# Patient Record
Sex: MALE | Race: WHITE | NOT HISPANIC OR LATINO | Employment: UNEMPLOYED | URBAN - METROPOLITAN AREA
[De-identification: names, ages, dates, MRNs, and addresses within clinical notes are randomized per-mention and may not be internally consistent; named-entity substitution may affect disease eponyms.]

---

## 2022-03-07 ENCOUNTER — OFFICE VISIT (OUTPATIENT)
Dept: FAMILY MEDICINE | Facility: CLINIC | Age: 32
End: 2022-03-07
Payer: COMMERCIAL

## 2022-03-07 VITALS
TEMPERATURE: 98.6 F | HEART RATE: 70 BPM | BODY MASS INDEX: 21.36 KG/M2 | SYSTOLIC BLOOD PRESSURE: 132 MMHG | DIASTOLIC BLOOD PRESSURE: 76 MMHG | RESPIRATION RATE: 16 BRPM | OXYGEN SATURATION: 99 % | WEIGHT: 166.4 LBS | HEIGHT: 74 IN

## 2022-03-07 DIAGNOSIS — Z23 NEED FOR TDAP VACCINATION: ICD-10-CM

## 2022-03-07 DIAGNOSIS — Z00.00 ROUTINE GENERAL MEDICAL EXAMINATION AT A HEALTH CARE FACILITY: Primary | ICD-10-CM

## 2022-03-07 DIAGNOSIS — Z13.1 SCREENING FOR DIABETES MELLITUS: ICD-10-CM

## 2022-03-07 DIAGNOSIS — Z13.220 SCREENING FOR HYPERLIPIDEMIA: ICD-10-CM

## 2022-03-07 DIAGNOSIS — F41.1 GENERALIZED ANXIETY DISORDER: ICD-10-CM

## 2022-03-07 DIAGNOSIS — L98.9 SKIN LESION OF SCALP: ICD-10-CM

## 2022-03-07 PROCEDURE — 90715 TDAP VACCINE 7 YRS/> IM: CPT | Performed by: NURSE PRACTITIONER

## 2022-03-07 PROCEDURE — 99385 PREV VISIT NEW AGE 18-39: CPT | Mod: 25 | Performed by: NURSE PRACTITIONER

## 2022-03-07 PROCEDURE — 90471 IMMUNIZATION ADMIN: CPT | Performed by: NURSE PRACTITIONER

## 2022-03-07 PROCEDURE — 99214 OFFICE O/P EST MOD 30 MIN: CPT | Mod: 25 | Performed by: NURSE PRACTITIONER

## 2022-03-07 RX ORDER — TRIAMCINOLONE ACETONIDE 5 MG/G
OINTMENT TOPICAL
Qty: 45 G | Refills: 0 | Status: SHIPPED | OUTPATIENT
Start: 2022-03-07

## 2022-03-07 ASSESSMENT — ANXIETY QUESTIONNAIRES
6. BECOMING EASILY ANNOYED OR IRRITABLE: MORE THAN HALF THE DAYS
2. NOT BEING ABLE TO STOP OR CONTROL WORRYING: MORE THAN HALF THE DAYS
GAD7 TOTAL SCORE: 17
3. WORRYING TOO MUCH ABOUT DIFFERENT THINGS: NEARLY EVERY DAY
IF YOU CHECKED OFF ANY PROBLEMS ON THIS QUESTIONNAIRE, HOW DIFFICULT HAVE THESE PROBLEMS MADE IT FOR YOU TO DO YOUR WORK, TAKE CARE OF THINGS AT HOME, OR GET ALONG WITH OTHER PEOPLE: VERY DIFFICULT
7. FEELING AFRAID AS IF SOMETHING AWFUL MIGHT HAPPEN: MORE THAN HALF THE DAYS
1. FEELING NERVOUS, ANXIOUS, OR ON EDGE: NEARLY EVERY DAY
5. BEING SO RESTLESS THAT IT IS HARD TO SIT STILL: NEARLY EVERY DAY

## 2022-03-07 ASSESSMENT — ENCOUNTER SYMPTOMS
CHILLS: 0
FREQUENCY: 0
PALPITATIONS: 0
HEMATOCHEZIA: 0
EYE PAIN: 0
HEADACHES: 0
DYSURIA: 0
MYALGIAS: 0
JOINT SWELLING: 0
DIZZINESS: 0
SORE THROAT: 0
SHORTNESS OF BREATH: 0
COUGH: 0
WEAKNESS: 0
NAUSEA: 0
HEMATURIA: 0
NERVOUS/ANXIOUS: 1
ABDOMINAL PAIN: 0
HEARTBURN: 0
ARTHRALGIAS: 0
PARESTHESIAS: 0
FEVER: 0
DIARRHEA: 0
CONSTIPATION: 0

## 2022-03-07 ASSESSMENT — PAIN SCALES - GENERAL: PAINLEVEL: NO PAIN (0)

## 2022-03-07 ASSESSMENT — PATIENT HEALTH QUESTIONNAIRE - PHQ9
SUM OF ALL RESPONSES TO PHQ QUESTIONS 1-9: 16
5. POOR APPETITE OR OVEREATING: MORE THAN HALF THE DAYS
SUM OF ALL RESPONSES TO PHQ QUESTIONS 1-9: 16
10. IF YOU CHECKED OFF ANY PROBLEMS, HOW DIFFICULT HAVE THESE PROBLEMS MADE IT FOR YOU TO DO YOUR WORK, TAKE CARE OF THINGS AT HOME, OR GET ALONG WITH OTHER PEOPLE: EXTREMELY DIFFICULT

## 2022-03-07 NOTE — PATIENT INSTRUCTIONS
Preventive Health Recommendations  Male Ages 26 - 39    Yearly exam:             See your health care provider every year in order to  o   Review health changes.   o   Discuss preventive care.    o   Review your medicines if your doctor has prescribed any.    You should be tested each year for STDs (sexually transmitted diseases), if you re at risk.     After age 35, talk to your provider about cholesterol testing. If you are at risk for heart disease, have your cholesterol tested at least every 5 years.     If you are at risk for diabetes, you should have a diabetes test (fasting glucose).  Shots: Get a flu shot each year. Get a tetanus shot every 10 years.     Nutrition:    Eat at least 5 servings of fruits and vegetables daily.     Eat whole-grain bread, whole-wheat pasta and brown rice instead of white grains and rice.     Get adequate Calcium and Vitamin D.     Lifestyle    Exercise for at least 150 minutes a week (30 minutes a day, 5 days a week). This will help you control your weight and prevent disease.     Limit alcohol to one drink per day.     No smoking.     Wear sunscreen to prevent skin cancer.     See your dentist every six months for an exam and cleaning.    Lifestyle Changes to improve Mood    - Take a Vitamin D 3- 4000 units daily     - Mind Over Mood (a workbook)     - Go to bed at 10 pm on school/work nights     - Avoid screens starting at 8 pm    - Exercise at minimum 30 minutes 3-4 times a week     - Eat three meals a day with protein, complex carbs, and a fruit or veggie and two snacks mid morning and mid afternoon           Consider adding Omega 3 supplement to diet.  Omega 3 has been shown to improve attention, cognitive clarity, and mood.  It is recommended the Omega 3 contains 1 gram of EPA (a component of Omega) daily.  Greenbrier Brite (omegabrite.com) or Coromega Max (found on Amazon or https://PandaBed)    Try the option of using Lumosity.com as another means of trying to improve  "your attention span.    Health/Lifestyle changes which can improve symptoms of anxiety, and depression, and ADHD/ADD:  - Avoid simple carbohydrates at breakfast.  Aim for only complex carbohydrates and lean protein for your morning meal.  - Engage in aerobic exercise 4 times per week for at least 30 minutes.  - Take a high quality multi-vitamin and antioxidant daily in conjunction with balanced nutrition.  - Aim for the high end of daily water intake, around 72 ounces per day.  - Ensure regular meals and snacks to maintain optimal attention.  - A source of information about the relationship between exercise and brain functioning is the book  Spark: The revolutionary new science of exercise and the brain  by Radames Webster.  - Avoid the use of caffeine in any amount.  - Keep alcohol consumption low, meaning only an occasional drink.  This is especially true when taking antidepressant medications.    Stress / Anxiety  - Recommend doing a free 8 week Mindfulness-Based Stress Reduction (MBSR) Program  o To access the free program, go to https://[employer]/index.html  - Check out [employer] for therapists and see who you \"click with.\"   - Consider a meditation Adelia if you want, check out Calm or Headspace - I like guided imagery / progressive relaxation    Sleep Hygiene  - Have a regular bedtime and a regular wake-up time  - No matter how tired you feel during the day, avoid napping before your regularly scheduled bedtime.  Napping diminishes your need for sleep and makes it difficult to sleep at night.  - Use your bed for sleeping (and sex) only.  - Make sure your bedroom is quiet and dark.  If noise is a problem, use earplugs.  If the light is a problem, cover your eyes with something to mask the light.  - Exercise regularly and often so that you use up all your energy and sleep the sleep of exhaustion.  Avoid exercising within 2 hours of bedtime.  - Give yourself an hour or so to unwind before you " get into bed.  Put aside serious matters and occupy yourself with something light, such as reading.  Consider taking a warm shower an hour before bedtime.  You might also want to try relaxation techniques.  If you are worried or preoccupied, set another time aside for journaling your feelings or writing your problems.  - Do the same thing every night before going to bed to help your body get ready for sleep.  - Have a ritual as your bedtime approaches, such as laying out your clothes for the next day, brushing your teeth, and then reading for 20 minutes.  Soon you will connect these activities with sleeping and they will make you sleepy.  - Avoid caffeine (coffee, tea, caffeinated soft drinks, and chocolate) within 6 hours of bedtime.  These will keep you awake.  - If you can't sleep, get up.  Sit quietly for about 20 minutes, and then return to bed.  Repeat this as many times as needed until you fall asleep.  - Avoid getting upset over your sleeplessness.  A lack of sleep will not harm you.  At worst, you will feel tired the next day.  - Avoid over-the-counter sleeping medication if you can.  Sleeping medications may leave you groggy the next day and require higher and higher doses over time.  Once you stop taking sleeping medications, your sleep problems may come back.  At best, sleeping pills are temporary relief, not a cure, and using them may make your sleeping problems worse.  - Invest in a weighted blanket or Sleep Pod-Adult Swaddle [place], which provides Deep Touch Pressure Therapy to help calm, relax, and improve sleep.  - Keep alcohol consumption low, meaning only an occasional drink.    Mental Health Referral Recommendations for Therapy    - Saleem Bustamante PhD LP  (For children, adolescents, and young adults, ADD/ADHD, Autism)  [address]  [address].  [place], [place] [address] [phone]  - NORMAN Santiago through [facility]  - Luis Gonzalez, PhD, Licensed  "Psychologist, LMFT through [facility] [place] [address].  [phone]   Coaching - individual and parent.  Family/Marital Therapy  - [facility], [place], [place].  [phone]   Individual, Family, and Group Psychotherapy  - [facility] and you can search for therapists and see who you \"click with.\"       Mental Health Referral Recommendations for Assessment/Testing/Treatment for all ages    - Saleem Bustamante PhD LP  (For children, adolescents, and young adults, ADD/ADHD, Autism)  [address]  [address] [address]  [phone]  - [facility]  - Luis Gonzalez PhD, Licensed Psychologist, LMFT through [facility] [place] [address].  [phone]   Assessment/Testing for Mood Disorders, Learning Disabilities, ADD/ADHD, Complex Issues  - [facility], [place], [place].  [phone]   Assessment/Testing for Depression, Anxiety, Bipolar, ADHD/ADD, Learning Disabilities, Autism Spectrum Disorders, Memory Testing, Schizophrenic Disorders    "

## 2022-03-07 NOTE — PROGRESS NOTES
SUBJECTIVE:   CC: Simon Corrales is an 31 year old male who presents for preventative health visit.       Patient has been advised of split billing requirements and indicates understanding: Yes  Healthy Habits:     Getting at least 3 servings of Calcium per day:  Yes    Bi-annual eye exam:  Yes    Dental care twice a year:  NO    Sleep apnea or symptoms of sleep apnea:  None    Diet:  Regular (no restrictions)    Frequency of exercise:  2-3 days/week    Duration of exercise:  15-30 minutes    Taking medications regularly:  Yes    Medication side effects:  None    PHQ-2 Total Score: 5    Additional concerns today:  No    Rash behind right ear, did have similar  Rash in 2014.   Says there was a little pus at first.  Had for a couple weeks.  Hydrocortisone for itch helps.    Concerns of Anxiety:  Says he has had anxiety for years.  Says he was diagnosed with ADD back in college and was on Adderall that helped a little but curbed his appetite.  Deals with some social anxiety and anxiety at work.    Currently unemployed since December 2021.  Company went under.  Was in design industry.    Is a drummer    Today's PHQ-2 Score:   PHQ-2 ( 1999 [employer]) 3/7/2022   Q1: Little interest or pleasure in doing things 3   Q2: Feeling down, depressed or hopeless 2   PHQ-2 Score 5   Q1: Little interest or pleasure in doing things Nearly every day   Q2: Feeling down, depressed or hopeless More than half the days   PHQ-2 Score 5       Abuse: Current or Past(Physical, Sexual or Emotional)- NO  Do you feel safe in your environment? YES    Have you ever done Advance Care Planning? (For example, a Health Directive, POLST, or a discussion with a medical provider or your loved ones about your wishes): No, advance care planning information given to patient to review.  Patient plans to discuss their wishes with loved ones or provider.      Social History     Tobacco Use     Smoking status: Never Smoker     Smokeless tobacco: Never Used    Substance Use Topics     Alcohol use: Yes     Comment: a few per wk     If you drink alcohol do you typically have >3 drinks per day or >7 drinks per week? No    Alcohol Use 3/7/2022   Prescreen: >3 drinks/day or >7 drinks/week? No   Prescreen: >3 drinks/day or >7 drinks/week? -   No flowsheet data found.    Last PSA: No results found for: PSA    Reviewed orders with patient. Reviewed health maintenance and updated orders accordingly - Yes  BP Readings from Last 3 Encounters:   03/07/22 132/76    Wt Readings from Last 3 Encounters:   03/07/22 75.5 kg (166 lb 6.4 oz)                  Patient Active Problem List   Diagnosis     Generalized anxiety disorder     History reviewed. No pertinent surgical history.    Social History     Tobacco Use     Smoking status: Never Smoker     Smokeless tobacco: Never Used   Substance Use Topics     Alcohol use: Yes     Comment: a few per wk     Family History   Problem Relation Age of Onset     Hyperlipidemia Father      Anxiety Disorder Brother      Depression Brother          No Known Allergies    Reviewed and updated as needed this visit by clinical staff   Tobacco  Allergies  Meds  Problems  Med Hx  Surg Hx  Fam Hx          Reviewed and updated as needed this visit by Provider     Meds  Problems  Med Hx  Surg Hx  Fam Hx           Review of Systems   Constitutional: Negative for chills and fever.   HENT: Negative for congestion, ear pain, hearing loss and sore throat.    Eyes: Negative for pain and visual disturbance.   Respiratory: Negative for cough and shortness of breath.    Cardiovascular: Negative for chest pain, palpitations and peripheral edema.   Gastrointestinal: Negative for abdominal pain, constipation, diarrhea, heartburn, hematochezia and nausea.   Genitourinary: Negative for dysuria, frequency, genital sores, hematuria, impotence, penile discharge and urgency.   Musculoskeletal: Negative for arthralgias, joint swelling and myalgias.   Skin: Positive  "for rash.   Neurological: Negative for dizziness, weakness, headaches and paresthesias.   Psychiatric/Behavioral: Negative for mood changes. The patient is nervous/anxious.        OBJECTIVE:   /76 (BP Location: Right arm)   Pulse 70   Temp 98.6  F (37  C) (Oral)   Resp 16   Ht 1.873 m (6' 1.75\")   Wt 75.5 kg (166 lb 6.4 oz)   SpO2 99%   BMI 21.51 kg/m      Physical Exam  GENERAL: healthy, alert and no distress  EYES: Eyes grossly normal to inspection, PERRL and conjunctivae and sclerae normal  HENT: ear canals and TM's normal, nose and mouth without ulcers or lesions  NECK: no adenopathy, no asymmetry, masses, or scars and thyroid normal to palpation  RESP: lungs clear to auscultation - no rales, rhonchi or wheezes  CV: regular rate and rhythm, normal S1 S2, no S3 or S4, no murmur, click or rub, no peripheral edema and peripheral pulses strong  ABDOMEN: soft, nontender, no hepatosplenomegaly, no masses and bowel sounds normal  MS: no gross musculoskeletal defects noted, no edema  SKIN:  On scalp behind right ear there is a pink circular, fine plaque  NEURO: Normal strength and tone, mentation intact and speech normal  PSYCH: mentation appears normal, affect normal/bright        ASSESSMENT/PLAN:   Simon was seen today for physical.    Diagnoses and all orders for this visit:    Routine general medical examination at a health care facility    Generalized anxiety disorder  Uncontrolled  - Therapeutic listening provided.  Discussed with patient the indication and use of medication(s), risks/benefits, and potential adverse side effects.  Patient/guardian verbalized understanding and agreement with the plan.   -     sertraline (ZOLOFT) 50 MG tablet; Take 1 tablet (50 mg) by mouth daily  -     Adult Mental Health  Referral; Future  Recommend considering starting therapy and patient agreeable to referral.  I also gave him some therapy options outside of [place] as well.  - Follow-up in 4 weeks for " "recheck    Skin lesion of scalp  -     triamcinolone (KENALOG) 0.5 % external ointment; Apply twice daily to lesion on scalp for 14 days  If does not improve, consider doing KOH scraping or antifungal    Screening for hyperlipidemia  -     Lipid panel reflex to direct LDL Fasting; Future    Screening for diabetes mellitus  -     Glucose; Future    Need for Tdap vaccination  -     TDAP VACCINE (Adacel, Boostrix)  [[id number]]          COUNSELING:   Reviewed preventive health counseling, as reflected in patient instructions       Regular exercise       Healthy diet/nutrition    Estimated body mass index is 21.51 kg/m  as calculated from the following:    Height as of this encounter: 1.873 m (6' 1.75\").    Weight as of this encounter: 75.5 kg (166 lb 6.4 oz).         He reports that he has never smoked. He has never used smokeless tobacco.      Counseling Resources:  ATP IV Guidelines  Pooled Cohorts Equation Calculator  FRAX Risk Assessment  ICSI Preventive Guidelines  Dietary Guidelines for Americans, 2010  Mobi's MyPlate  ASA Prophylaxis  Lung CA Screening    Elvira French NP  [facility]  Answers for HPI/ROS submitted by the patient on 3/7/2022  If you checked off any problems, how difficult have these problems made it for you to do your work, take care of things at home, or get along with other people?: Extremely difficult  PHQ9 TOTAL SCORE: 16      "

## 2022-03-08 ASSESSMENT — ANXIETY QUESTIONNAIRES: GAD7 TOTAL SCORE: 17

## 2022-03-08 ASSESSMENT — PATIENT HEALTH QUESTIONNAIRE - PHQ9: SUM OF ALL RESPONSES TO PHQ QUESTIONS 1-9: 16

## 2022-03-10 PROBLEM — F41.1 GENERALIZED ANXIETY DISORDER: Status: ACTIVE | Noted: 2022-03-10

## 2022-04-06 ENCOUNTER — TELEPHONE (OUTPATIENT)
Dept: FAMILY MEDICINE | Facility: CLINIC | Age: 32
End: 2022-04-06

## 2022-04-06 ENCOUNTER — TELEPHONE (OUTPATIENT)
Dept: FAMILY MEDICINE | Facility: CLINIC | Age: 32
End: 2022-04-06
Payer: COMMERCIAL

## 2022-04-06 NOTE — TELEPHONE ENCOUNTER
Please call pharmacy    RN on hold for 20 min.    RN calling to determine why patient does not have refill.  Patient notified via text from pharmacy.        RN spoke with patient.    Patient stated he had received a text from [facility] that he need to contact his Provider for refill on Zoloft.    RN noted there should be a refill on file.    RN advised she would reach out to the pharmacy to discuss.  RN unable to speak with pharmacy  On hold for extended time    RN aleena patient would attempt again to call.    Doni Armendariz RN, BSN, PHN  [facility]

## 2022-04-07 NOTE — TELEPHONE ENCOUNTER
RN was able to get thru to patients pharmacy.    Patient does have refill on file.  They will fill medication today and text patient when it is ready.    RN called and left detailed VM advising prescription was being filled.    Patient stated yesterday it was ok to leave message.    Doni Armendariz, RN, BSN, PHN  [facility]

## 2022-04-19 ENCOUNTER — VIRTUAL VISIT (OUTPATIENT)
Dept: FAMILY MEDICINE | Facility: CLINIC | Age: 32
End: 2022-04-19
Payer: COMMERCIAL

## 2022-04-19 DIAGNOSIS — F41.1 GENERALIZED ANXIETY DISORDER: Primary | ICD-10-CM

## 2022-04-19 PROCEDURE — 96127 BRIEF EMOTIONAL/BEHAV ASSMT: CPT | Mod: 95 | Performed by: NURSE PRACTITIONER

## 2022-04-19 PROCEDURE — 99213 OFFICE O/P EST LOW 20 MIN: CPT | Mod: 95 | Performed by: NURSE PRACTITIONER

## 2022-04-19 ASSESSMENT — ANXIETY QUESTIONNAIRES
6. BECOMING EASILY ANNOYED OR IRRITABLE: NOT AT ALL
1. FEELING NERVOUS, ANXIOUS, OR ON EDGE: SEVERAL DAYS
IF YOU CHECKED OFF ANY PROBLEMS ON THIS QUESTIONNAIRE, HOW DIFFICULT HAVE THESE PROBLEMS MADE IT FOR YOU TO DO YOUR WORK, TAKE CARE OF THINGS AT HOME, OR GET ALONG WITH OTHER PEOPLE: SOMEWHAT DIFFICULT
2. NOT BEING ABLE TO STOP OR CONTROL WORRYING: NOT AT ALL
5. BEING SO RESTLESS THAT IT IS HARD TO SIT STILL: SEVERAL DAYS
GAD7 TOTAL SCORE: 3
3. WORRYING TOO MUCH ABOUT DIFFERENT THINGS: SEVERAL DAYS
7. FEELING AFRAID AS IF SOMETHING AWFUL MIGHT HAPPEN: NOT AT ALL

## 2022-04-19 ASSESSMENT — PATIENT HEALTH QUESTIONNAIRE - PHQ9: 5. POOR APPETITE OR OVEREATING: NOT AT ALL

## 2022-04-19 NOTE — PROGRESS NOTES
Simon is a 31 year old who is being evaluated via a billable telephone visit.      What phone number would you like to be contacted at? [phone]  How would you like to obtain your AVS? Mail a copy    Assessment & Plan     Generalized anxiety disorder  Chronic, stable, continue current dosage of:  - sertraline (ZOLOFT) 50 MG tablet; Take 1 tablet (50 mg) by mouth daily  - Has initial therapy visit scheduled for next week.                 Return in about 6 months (around 10/19/2022) for anxiety check.    Elvira French NP  [facility]   Simon is a 31 year old who presents for the following health issues     HPI     Anxiety Follow-Up    How are you doing with your anxiety since your last visit? Improved      Are you having other symptoms that might be associated with anxiety? No    Have you had a significant life event? No     Are you feeling depressed? No    Do you have any concerns with your use of alcohol or other drugs? No  Feels more calm, less figidity with the Sertraline 50 mg.  When first starting the medication he had loose stools so he did cut back to 1/2 tablet and then was able to increase to 1 tablet daily.  He is tolerating this now without side effects.  Would like to continue with current dosage.  He does have initial therapy appointment scheduled next week.    Arben pete now    Says the rash from last visit has resolved.    Social History     Tobacco Use     Smoking status: Never Smoker     Smokeless tobacco: Never Used   Substance Use Topics     Alcohol use: Yes     Comment: a few per wk     Drug use: Yes     Types: Marijuana     DAVE-7 SCORE 3/7/2022 4/19/2022   Total Score 17 3     PHQ 3/7/2022   PHQ-9 Total Score 16   Q9: Thoughts of better off dead/self-harm past 2 weeks Not at all     DAVE-7  4/19/2022   1. Feeling nervous, anxious, or on edge 1   2. Not being able to stop or control worrying 0   3. Worrying too much about different things 1   4.  Trouble relaxing 0   5. Being so restless that it is hard to sit still 1   6. Becoming easily annoyed or irritable 0   7. Feeling afraid, as if something awful might happen 0   DAVE-7 Total Score 3   If you checked any problems, how difficult have they made it for you to do your work, take care of things at home, or get along with other people? Somewhat difficult         How many servings of fruits and vegetables do you eat daily?  2-3    On average, how many sweetened beverages do you drink each day (Examples: soda, juice, sweet tea, etc.  Do NOT count diet or artificially sweetened beverages)?   1    How many days per week do you exercise enough to make your heart beat faster? 7    How many minutes a day do you exercise enough to make your heart beat faster? 30 - 60    How many days per week do you miss taking your medication? 0    Review of Systems   Constitutional, HEENT, cardiovascular, pulmonary, gi and psychiatric systems are negative, except as otherwise noted.      Objective           Vitals:  No vitals were obtained today due to virtual visit.    Physical Exam   healthy, alert and no distress  PSYCH: Alert and oriented times 3; coherent speech, normal   rate and volume, able to articulate logical thoughts, able   to abstract reason, no tangential thoughts, no hallucinations   or delusions  His affect is normal  RESP: No cough, no audible wheezing, able to talk in full sentences  Remainder of exam unable to be completed due to telephone visits              Phone call duration: 8 minutes

## 2022-04-20 ASSESSMENT — ANXIETY QUESTIONNAIRES: GAD7 TOTAL SCORE: 3

## 2022-04-26 ENCOUNTER — TELEPHONE (OUTPATIENT)
Dept: BEHAVIORAL HEALTH | Facility: CLINIC | Age: 32
End: 2022-04-26
Payer: COMMERCIAL

## 2022-04-26 NOTE — TELEPHONE ENCOUNTER
Left a VM to remind pt about their Thursday 4/28/22 video appt at 4:30 pm, that will be connect through video link via e-mail: [email or web address].

## 2022-04-27 ASSESSMENT — PATIENT HEALTH QUESTIONNAIRE - PHQ9
SUM OF ALL RESPONSES TO PHQ QUESTIONS 1-9: 10
10. IF YOU CHECKED OFF ANY PROBLEMS, HOW DIFFICULT HAVE THESE PROBLEMS MADE IT FOR YOU TO DO YOUR WORK, TAKE CARE OF THINGS AT HOME, OR GET ALONG WITH OTHER PEOPLE: SOMEWHAT DIFFICULT
SUM OF ALL RESPONSES TO PHQ QUESTIONS 1-9: 10

## 2022-04-28 ENCOUNTER — VIRTUAL VISIT (OUTPATIENT)
Dept: PSYCHOLOGY | Facility: CLINIC | Age: 32
End: 2022-04-28
Attending: NURSE PRACTITIONER
Payer: COMMERCIAL

## 2022-04-28 DIAGNOSIS — F41.1 GENERALIZED ANXIETY DISORDER: ICD-10-CM

## 2022-04-28 DIAGNOSIS — F43.21 ADJUSTMENT DISORDER WITH DEPRESSED MOOD: Primary | ICD-10-CM

## 2022-04-28 PROCEDURE — 90791 PSYCH DIAGNOSTIC EVALUATION: CPT | Mod: 95 | Performed by: SOCIAL WORKER

## 2022-04-28 ASSESSMENT — PATIENT HEALTH QUESTIONNAIRE - PHQ9: SUM OF ALL RESPONSES TO PHQ QUESTIONS 1-9: 10

## 2022-04-28 ASSESSMENT — COLUMBIA-SUICIDE SEVERITY RATING SCALE - C-SSRS: 1. HAVE YOU WISHED YOU WERE DEAD OR WISHED YOU COULD GO TO SLEEP AND NOT WAKE UP?: NO

## 2022-04-28 NOTE — PROGRESS NOTES
"    [facility]  Provider Name:  Colette Sauceda     Credentials:  SELWYN, MIGUEL    PATIENT'S NAME: Simon Corrales  PREFERRED NAME: Simon  PRONOUNS:  he/him     MRN: [id number]  : 1990  ADDRESS: [address] [place] [id number]  ACCT. NUMBER:  [id number]  DATE OF SERVICE: 22  START TIME: 16:35  END TIME: 17:22  PREFERRED PHONE: [phone]  May we leave a program related message: Yes  SERVICE MODALITY:  Video Visit:      Provider verified identity through the following two step process.  Patient provided:  Patient  and Patient address    Telemedicine Visit: The patient's condition can be safely assessed and treated via synchronous audio and visual telemedicine encounter.      Reason for Telemedicine Visit: Patient has requested telehealth visit    Originating Site (Patient Location): Patient's home    Distant Site (Provider Location): Provider Remote Setting- Home Office    Consent:  The patient/guardian has verbally consented to: the potential risks and benefits of telemedicine (video visit) versus in person care; bill my insurance or make self-payment for services provided; and responsibility for payment of non-covered services.     Patient would like the video invitation sent by:  My Chart    Mode of Communication:  Video Conference via [employer]    As the provider I attest to compliance with applicable laws and regulations related to telemedicine.    UNIVERSAL ADULT Mental Health DIAGNOSTIC ASSESSMENT    Identifying Information:  Patient is a 31 year old, .  The pronoun use throughout this assessment reflects the patient's chosen pronoun.  Patient was referred for an assessment by primary care clinic.  Patient attended the session alone.    Chief Complaint:   The reason for seeking services at this time is: \"General anxiety, lack of motivation, self disappointment\"  \"It is the first I have ever been medicated for anxiety and I know that I have always had anxiety.  " "Anxiety has been more intense lately.\"  The problem(s) began 5/1/2012.    Patient has attempted to resolve these concerns in the past through playing the drumEventap and recently started medication.    Social/Family History:  Patient reported they grew up in other [place], [place].  They were raised by biological parents  .  Parents were always together.  Patient reported that their childhood was \"[place] doing some sort of activity or another.\"  Reports one older brother.  Patient described their current relationships with family of origin as \"I am pretty close with brother, who lives in [place].  My parents I would say, I very much love them.  I keep a lot of things from them.\"     The patient describes their cultural background as .  Cultural influences and impact on patient's life structure, values, norms, and healthcare: Both parents are Worship ([employer]) and I grew up attending [employer] regularly, including summer Bible school and [employer]ism camps. ;[place] I graduated in a class of 100 students and knew most of them throughout elementary, middle and high school..  Contextual influences on patient's health include: Individual Factors concerns with motivation and concentration, Economic Factors Job loss in November 2021 and Health- Seeking Factors trying therapy for first time.    These factors will be addressed in the Preliminary Treatment plan. Patient identified their preferred language to be English. Patient reported they does not need the assistance of an  or other support involved in therapy.     Patient reported had no significant delays in developmental tasks.   Patient's highest education level was college graduate  .  Patient identified the following learning problems: none reported.  Modifications will not be used to assist communication in therapy. Patient reports they are  able to understand written " "materials.    Patient reported the following relationship history \"Great, we were best friends before we started dating.\"  Patient's current relationship status is  for 5 years.   Patient identified their sexual orientation as heterosexual.  Patient reported having   child(héctor). Patient identified partner; mother as part of their support system.  Patient identified the quality of these relationships as good,  .      Patient's current living/housing situation involves staying in own home/apartment.  The immediate members of family and household include Juana Arango, 32,Wife and they report that housing is stable.    Patient is currently unemployed.  Patient reports their finances are obtained through spouse; other. Patient does identify finances as a current stressor.      Patient reported that they have not been involved with the legal system.  . Patient does not report being under probation/ parole/ jurisdiction.     Patient's Strengths and Limitations:  Patient identified the following strengths or resources that will help them succeed in treatment: motivation. Things that may interfere with the patient's success in treatment include: not being able to fully trust the process.     Assessments:  The following assessments were completed by patient for this visit:  PHQ2:   PHQ-2 ( 1999 [employer]) 4/27/2022 4/19/2022 3/7/2022   Q1: Little interest or pleasure in doing things 2 0 3   Q2: Feeling down, depressed or hopeless 1 0 2   PHQ-2 Score 3 0 5   Q1: Little interest or pleasure in doing things More than half the days - Nearly every day   Q2: Feeling down, depressed or hopeless Several days - More than half the days   PHQ-2 Score 3 - 5     PHQ9:   PHQ-9 SCORE 3/7/2022 4/27/2022   PHQ-9 Total Score MyChart 16 (Moderately severe depression) 10 (Moderate depression)   PHQ-9 Total Score 16 10     GAD2:   DAVE-2 4/27/2022   Feeling nervous, anxious, or on edge 1   Not being able to stop or control worrying 1   DAVE-2 " Total Score 2     GAD7:   DAVE-7 SCORE 3/7/2022 4/19/2022   Total Score 17 3     CAGE-AID:   CAGE-AID Total Score 4/27/2022   Total Score 3   Total Score MyChart 3 (A total score of 2 or greater is considered clinically significant)     PROMIS 10-Global Health (all questions and answers displayed):   PROMIS 10 4/27/2022   In general, would you say your health is: Good   In general, would you say your quality of life is: Good   In general, how would you rate your physical health? Good   In general, how would you rate your mental health, including your mood and your ability to think? Good   In general, how would you rate your satisfaction with your social activities and relationships? Fair   In general, please rate how well you carry out your usual social activities and roles Fair   To what extent are you able to carry out your everyday physical activities such as walking, climbing stairs, carrying groceries, or moving a chair? Completely   How often have you been bothered by emotional problems such as feeling anxious, depressed or irritable? Sometimes   How would you rate your fatigue on average? Mild   How would you rate your pain on average?   0 = No Pain  to  10 = Worst Imaginable Pain 0   In general, would you say your health is: 3   In general, would you say your quality of life is: 3   In general, how would you rate your physical health? 3   In general, how would you rate your mental health, including your mood and your ability to think? 3   In general, how would you rate your satisfaction with your social activities and relationships? 2   In general, please rate how well you carry out your usual social activities and roles. (This includes activities at home, at work and in your community, and responsibilities as a parent, child, spouse, employee, friend, etc.) 2   To what extent are you able to carry out your everyday physical activities such as walking, climbing stairs, carrying groceries, or moving a chair? 5    In the past 7 days, how often have you been bothered by emotional problems such as feeling anxious, depressed, or irritable? 3   In the past 7 days, how would you rate your fatigue on average? 2   In the past 7 days, how would you rate your pain on average, where 0 means no pain, and 10 means worst imaginable pain? 0   Global Mental Health Score 11   Global Physical Health Score 17   PROMIS TOTAL - SUBSCORES 28     [place] Suicide Severity Rating Scale (Lifetime/Recent)  [place] Suicide Severity Rating (Lifetime/Recent) 4/28/2022   1. Wish to be Dead (Lifetime) 0   Has subject engaged in non-suicidal self-injurious behavior? (Lifetime) 0       Personal and Family Medical History:  Patient does not report a family history of mental health concerns.  Patient reports family history includes Anxiety Disorder in his brother; Depression in his brother; Hyperlipidemia in his father..     Patient does report Mental Health Diagnosis and/or Treatment.  Patient Patient reported the following previous diagnoses which include(s): ADHD and an Anxiety Disorder.  Patient reported symptoms began in early 20s.   Patient has received mental health services in the past: primary care provider at received ADHD dx from primary care doctor..  Psychiatric Hospitalizations: None.  Patient denies a history of civil commitment.  Patient is receiving other mental health services.  These include primary care provider at [facility].  For follow-up on as needed.         Patient has had a physical exam to rule out medical causes for current symptoms.  Date of last physical exam was within the past year. Symptoms have developed since last physical exam and client was encouraged to follow up with PCP.  . The patient has a [facility] Primary Care Provider, who is named Elvira French.  Patient reports no current medical concerns.  Patient denies any issues with pain..   There are not significant appetite / nutritional concerns / weight changes.    Patient does not report a history of head injury / trauma / cognitive impairment.     Patient reports current meds as:   Outpatient Medications Marked as Taking for the 4/28/22 encounter (Virtual Visit) with Colette Sauceda LICSW   Medication Sig     sertraline (ZOLOFT) 50 MG tablet Take 1 tablet (50 mg) by mouth daily       Medication Adherence:  Patient reports taking.    Patient Allergies:  No Known Allergies - reviewed with patient    Medical History:  No past medical history on file.      Current Mental Status Exam:   Appearance:  Appropriate    Eye Contact:  Good   Psychomotor:  Normal       Gait / station:  Unable to assess via video  Attitude / Demeanor: Cooperative  Interested  Speech      Rate / Production: Normal/ Responsive      Volume:  Normal  volume      Language:  intact  Mood:   Normal  Affect:   Appropriate    Thought Content: Clear   Thought Process: Coherent  Logical       Associations: No loosening of associations  Insight:   Good   Judgment:  Intact   Orientation:  All  Attention/concentration: Good      Substance Use:  Patient did not report a family history of substance use concerns; see medical history section for details.  Patient has not received chemical dependency treatment in the past.  Patient has not ever been to detox.      Patient is not currently receiving any chemical dependency treatment.           Substance History of use Age of first use Date of last use     Pattern and duration of use (include amounts and frequency)   Alcohol currently use   17 4/23/2022 REPORTS SUBSTANCE USE: reports using substance 3 times per week and has 3 beer at a time.   Patient reports heaviest use was early twenties drank a little more.   Cannabis   currently use 19 4/20/2022 REPORTS SUBSTANCE USE: reports using substance 3 times per week and has couple of puffs from vape pen  from bed at a time.   Patient reports heaviest use is current use.     Amphetamines   used in the past   10/1/2011  prescribed   Cocaine/crack    never used       REPORTS SUBSTANCE USE: N/A   Hallucinogens used in the past   29  7/1/2021  REPORTS SUBSTANCE USE: reports using substance 1 times per year and has 1 mushroom bit at a time.   Patient reports heaviest use is current use.   Inhalants never used         REPORTS SUBSTANCE USE: N/A   Heroin never used         REPORTS SUBSTANCE USE: N/A   Other Opiates never used     REPORTS SUBSTANCE USE: N/A   Benzodiazepine   never used     REPORTS SUBSTANCE USE: N/A   Barbiturates never used     REPORTS SUBSTANCE USE: N/A   Over the counter meds used in the past 5 2/15/2022 As needed when sick   Caffeine currently use 4   REPORTS SUBSTANCE USE: reports using substance 1 times per day and has 1 coffee at a time.   Patient reports heaviest use is current use.   Nicotine  currently use 17 4/23/2022 REPORTS SUBSTANCE USE: reports using substance 2 times per month and has 2 cigarettes at a time.   Patient reports heaviest use is current use.   Other substances not listed above:  Identify:  never used     REPORTS SUBSTANCE USE: N/A     Patient reported the following problems as a result of their substance use: no problems, not applicable.    Substance Use: No symptoms    Based on the positive CAGE score and clinical interview there  are not indications of drug or alcohol abuse.      Significant Losses / Trauma / Abuse / Neglect Issues:   Patient did not serve in the .  There are indications or report of significant loss, trauma, abuse or neglect issues related to: job loss November 2021.  Concerns for possible neglect are not present.     Safety Assessment:   Patient denies current homicidal ideation and behaviors.  Patient denies current self-injurious ideation and behaviors.    Patient denied risk behaviors associated with substance use.  Patient denies any high risk behaviors associated with mental health symptoms.  Patient reports the following current concerns for their personal  safety: None.  Patient reports there are not firearms in the house.           History of Safety Concerns:  Patient denied a history of homicidal ideation.     Patient denied a history of personal safety concerns.    Patient denied a history of assaultive behaviors.    Patient denied a history of sexual assault behaviors.     Patient denied a history of risk behaviors associated with substance use.  Patient denies any history of high risk behaviors associated with mental health symptoms.  Patient reports the following protective factors: forward or future oriented thinking; dedication to family or friends; safe and stable environment; regular sleep; regular physical activity; sense of belonging; purpose; living with other people; sense of meaning; healthy fear of risky behaviors or pain; financial stability; strong sense of self worth or esteem; access to a variety of clinical interventions and pets    Risk Plan:  See Recommendations for Safety and Risk Management Plan    Review of Symptoms per patient report:  Depression: Lack of interest, Excessive or inappropriate guilt, Change in energy level and Feeling sad, down, or depressed  Martha:  No Symptoms  Psychosis: No Symptoms  Anxiety: Excessive worry, Nervousness, Physical complaints, such as headaches, stomachaches, muscle tension, Social anxiety, Psychomotor agitation and Ruminations  Panic:  No symptoms  Post Traumatic Stress Disorder:  No Symptoms   Eating Disorder: No Symptoms  ADD / ADHD:  Poor task completion, Distractibility, Forgetful and Restlessness/fidgety  Conduct Disorder: No symptoms  Autism Spectrum Disorder: No symptoms  Obsessive Compulsive Disorder: Checking and Symetry    Patient reports the following compulsive behaviors and treatment history: none.      Diagnostic Criteria:   Generalized Anxiety Disorder  A. Excessive anxiety and worry about a number of events or activities (such as work or school performance).   B. The person finds it  difficult to control the worry.  C. Select 3 or more symptoms (required for diagnosis). Only one item is required in children.   - Restlessness or feeling keyed up or on edge.    - Being easily fatigued.    - Difficulty concentrating or mind going blank.    - Muscle tension.   D. The focus of the anxiety and worry is not confined to features of an Axis I disorder.  E. The anxiety, worry, or physical symptoms cause clinically significant distress or impairment in social, occupational, or other important areas of functioning.   F. The disturbance is not due to the direct physiological effects of a substance (e.g., a drug of abuse, a medication) or a general medical condition (e.g., hyperthyroidism) and does not occur exclusively during a Mood Disorder, a Psychotic Disorder, or a Pervasive Developmental Disorder. Adjustment Disorder  A. The development of emotional or behavioral symptoms in response to an identifiable stressor(s) occurring within 3 months of the onset of the stressor(s)  B. These symptoms or behaviors are clinically significant, as evidenced by one or both of the following:       - Marked distress that is out of proportion to the severity/intensity of the stressor (with consideration for external context & culture)       - Significant impairment in social, occupational, or other important areas of functioning  C. The stress-related disturbance does not meet criteria for another disorder & is not not an exacerbation of another mental disorder  D. The symptoms do not represent normal bereavement  E. Once the stressor or its consequences have terminated, the symptoms do not persist for more than an additional 6 months       * Adjustment Disorder with Depressed Mood: The predominant manifestations are symptoms such as low mood, tearfulness, or feelings of hopelessness    Functional Status:  Patient reports the following functional impairments:  social interactions and work / vocational responsibilities.      Nonprogrammatic care:  Patient is requesting basic services to address current mental health concerns.    Clinical Summary:  1. Reason for assessment: lack of motivation, anxiety symptoms and .  2. Psychosocial, Cultural and Contextual Factors: Individual Factors concerns with motivation and concentration, Economic Factors Job loss in November 2021 and Health- Seeking Factors trying therapy for first time  .  3. Principal DSM5 Diagnoses  (Sustained by DSM5 Criteria Listed Above):   300.02 (F41.1) Generalized Anxiety Disorder  Adjustment Disorders  309.0 (F43.21) With depressed mood.  4. Other Diagnoses that is relevant to services:  defered  5. Provisional Diagnosis:  defered  6. Prognosis: Return to Normal Functioning and Expect Improvement.  7. Likely consequences of symptoms if not treated: Patient could see worsening symptoms and be in need of a higher level of care.  8. Client strengths include:  creative, educated and wants to learn .     Recommendations:     1. Plan for Safety and Risk Management:   Recommended that patient call 911 or go to the local ED should there be a change in any of these risk factors..          Report to child / adult protection services was NA.     2. Patient's identified cultural concerns will be addressed by discussing with patient personal values related to growing up in a small town.     3. Initial Treatment will focus on:    Anxiety - related to social situations  Adjustment Difficulties related to: recent job loss.     4. Resources/Service Plan:    services are not indicated.   Modifications to assist communication are not indicated.   Additional disability accommodations are not indicated.      5. Collaboration:   Collaboration / coordination of treatment will be initiated with the following  support professionals: primary care physician.      6.  Referrals:   The following referral(s) will be initiated: Outpatient Mental Scar Therapy. Next Scheduled Appointment:  5/4/22.     A Release of Information has been obtained for the following: verbal as needed.    7. JAY:    JAY:  Discussed the general effects of drugs and alcohol on health and well-being. Provider gave patient printed information about the effects of chemical use on their health and well being. Recommendations:  none .     8. Records:   These were reviewed at time of assessment.   Information in this assessment was obtained from the medical record and  provided by patient who is a good historian.    Patient will have open access to their mental health medical record.        Provider Name/ Credentials:  MIGUEL Limon    April 28, 2022

## 2022-05-04 ENCOUNTER — VIRTUAL VISIT (OUTPATIENT)
Dept: PSYCHOLOGY | Facility: CLINIC | Age: 32
End: 2022-05-04
Payer: COMMERCIAL

## 2022-05-04 DIAGNOSIS — F43.21 ADJUSTMENT DISORDER WITH DEPRESSED MOOD: ICD-10-CM

## 2022-05-04 DIAGNOSIS — F41.1 GENERALIZED ANXIETY DISORDER: Primary | ICD-10-CM

## 2022-05-04 PROCEDURE — 90834 PSYTX W PT 45 MINUTES: CPT | Mod: 95 | Performed by: SOCIAL WORKER

## 2022-05-04 NOTE — PROGRESS NOTES
[facility]                                     Progress Note    Patient Name: Simon Corrales  Date: May 4, 2022         Service Type: Individual      Session Start Time: 11:00  Session End Time: 11:39     Session Length: 39    Session #: 2    Attendees: Client attended alone    Service Modality:  Video Visit:      Provider verified identity through the following two step process.  Patient provided:  Patient is known previously to provider    Telemedicine Visit: The patient's condition can be safely assessed and treated via synchronous audio and visual telemedicine encounter.      Reason for Telemedicine Visit: Patient has requested telehealth visit    Originating Site (Patient Location): Patient's home    Distant Site (Provider Location): Provider Remote Setting- Home Office    Consent:  The patient/guardian has verbally consented to: the potential risks and benefits of telemedicine (video visit) versus in person care; bill my insurance or make self-payment for services provided; and responsibility for payment of non-covered services.     Patient would like the video invitation sent by:  My Chart    Mode of Communication:  Video Conference via Amwell    As the provider I attest to compliance with applicable laws and regulations related to telemedicine.    DATA  Interactive Complexity: No  Crisis: No        Progress Since Last Session (Related to Symptoms / Goals / Homework):   Symptoms: No change First full session with patient    Homework: Achieved / completed to satisfaction      Episode of Care Goals: Achieved / completed to satisfaction - PREPARATION (Decided to change - considering how); Intervened by negotiating a change plan and determining options / strategies for behavior change, identifying triggers, exploring social supports, and working towards setting a date to begin behavior change     Current / Ongoing Stressors and Concerns:   Patient has lost job and is feeling unmotivated in  life.  He reports a history of anxiety and notes that anxiety makes it difficult for him to have meaningful relationships.     Treatment Objective(s) Addressed in This Session:   identify 3-5 fears / thoughts that contribute to feeling anxious  Develop treatment plan     Intervention:   ACT: Reviewed diagnoses from assessment.  Gained consent for ACT using two mountains metaphor.  Went over [facility] policy and patient acknowledged.  Worked with patient to develop treatment plan with goals around adjustment and anxiety.    Assessments completed prior to visit:  The following assessments were completed by patient for this visit:  PHQ2:   PHQ-2 ( 1999 [employer]) 4/27/2022 4/19/2022 3/7/2022   Q1: Little interest or pleasure in doing things 2 0 3   Q2: Feeling down, depressed or hopeless 1 0 2   PHQ-2 Score 3 0 5   Q1: Little interest or pleasure in doing things More than half the days - Nearly every day   Q2: Feeling down, depressed or hopeless Several days - More than half the days   PHQ-2 Score 3 - 5     PHQ9:   PHQ-9 SCORE 3/7/2022 4/27/2022   PHQ-9 Total Score MyChart 16 (Moderately severe depression) 10 (Moderate depression)   PHQ-9 Total Score 16 10     GAD2:   DAVE-2 4/27/2022   Feeling nervous, anxious, or on edge 1   Not being able to stop or control worrying 1   DAVE-2 Total Score 2     GAD7:   DAVE-7 SCORE 3/7/2022 4/19/2022   Total Score 17 3     PROMIS 10-Global Health (all questions and answers displayed):   PROMIS 10 4/27/2022   In general, would you say your health is: Good   In general, would you say your quality of life is: Good   In general, how would you rate your physical health? Good   In general, how would you rate your mental health, including your mood and your ability to think? Good   In general, how would you rate your satisfaction with your social activities and relationships? Fair   In general, please rate how well you carry out your usual social activities and roles Fair   To what extent are you able  to carry out your everyday physical activities such as walking, climbing stairs, carrying groceries, or moving a chair? Completely   How often have you been bothered by emotional problems such as feeling anxious, depressed or irritable? Sometimes   How would you rate your fatigue on average? Mild   How would you rate your pain on average?   0 = No Pain  to  10 = Worst Imaginable Pain 0   In general, would you say your health is: 3   In general, would you say your quality of life is: 3   In general, how would you rate your physical health? 3   In general, how would you rate your mental health, including your mood and your ability to think? 3   In general, how would you rate your satisfaction with your social activities and relationships? 2   In general, please rate how well you carry out your usual social activities and roles. (This includes activities at home, at work and in your community, and responsibilities as a parent, child, spouse, employee, friend, etc.) 2   To what extent are you able to carry out your everyday physical activities such as walking, climbing stairs, carrying groceries, or moving a chair? 5   In the past 7 days, how often have you been bothered by emotional problems such as feeling anxious, depressed, or irritable? 3   In the past 7 days, how would you rate your fatigue on average? 2   In the past 7 days, how would you rate your pain on average, where 0 means no pain, and 10 means worst imaginable pain? 0   Global Mental Health Score 11   Global Physical Health Score 17   PROMIS TOTAL - SUBSCORES 28         ASSESSMENT: Current Emotional / Mental Status (status of significant symptoms):   Risk status (Self / Other harm or suicidal ideation)   Patient denies current fears or concerns for personal safety.   Patient denies current or recent suicidal ideation or behaviors.   Patient denies current or recent homicidal ideation or behaviors.   Patient denies current or recent self injurious behavior  or ideation.   Patient denies other safety concerns.   Patient reports there has been no change in risk factors since their last session.     Patient reports there has been no change in protective factors since their last session.     Recommended that patient call 911 or go to the local ED should there be a change in any of these risk factors.     Appearance:   Appropriate    Eye Contact:   Good    Psychomotor Behavior: Normal    Attitude:   Cooperative  Interested   Orientation:   All   Speech    Rate / Production: Normal/ Responsive    Volume:  Normal    Mood:    Normal   Affect:    Appropriate    Thought Content:  Clear    Thought Form:  Coherent  Logical    Insight:    Good      Medication Review:   No changes to current psychiatric medication(s)     Medication Compliance:   Yes     Changes in Health Issues:   None reported     Chemical Use Review:   Substance Use: Chemical use reviewed, no active concerns identified      Tobacco Use: No change in amount of tobacco use since last session.  Patient declined discussion at this time    Diagnosis:  1. Generalized anxiety disorder    2. Adjustment disorder with depressed mood        Collateral Reports Completed:   Not Applicable    PLAN: (Patient Tasks / Therapist Tasks / Other)  Patient will use dropping an anchor in the morning        MIGUEL Limon                                                         ______________________________________________________________________    Individual Treatment Plan    Patient's Name: Simon Corrales  YOB: 1990    Date of Creation: May 4, 2022  Date Treatment Plan Last Reviewed/Revised: May 4, 2022    DSM5 Diagnoses: 300.02 (F41.1) Generalized Anxiety Disorder or Adjustment Disorders  309.0 (F43.21) With depressed mood  Psychosocial / Contextual Factors: Individual Factors concerns with motivation and concentration, Economic Factors Job loss in November 2021 and Health- Seeking Factors trying therapy  for first time  .  PROMIS (reviewed every 90 days): 4/28/22 28    Referral / Collaboration:  Referral to another professional/service is not indicated at this time..    Anticipated number of session for this episode of care: 10-20  Anticipation frequency of session: Every other week  Anticipated Duration of each session: 38-52 minutes  Treatment plan will be reviewed in 90 days or when goals have been changed.       MeasurableTreatment Goal(s) related to diagnosis / functional impairment(s)  Goal 1: Patient will work to improve motivation    I will know I've met my goal when having a schedule for 5/7 days, would be completing tasks around the house to fix items, working on design work, improving on different skills (animation work), continuing to build my portfolio, connecting with friends throughout the week (texting friends).      Objective #A (Patient Action)    Patient will Increase interest, engagement, and pleasure in doing things.  Status: New - Date: 5/4/22     Intervention(s)  Therapist will teach about values and valued action.    Objective #B  Patient will identify 3-4 strategies to more effectively address stressors.  Status: New - Date: 5/4/22     Intervention(s)  Therapist will assign homework to engage in compassion and activities that help manage stressors  teach compassion.      Goal 2: Patient will work on managing anxiety in social situations    I will know I've met my goal when I can tell my friends that something bothers me and I would be honest with friends about what is going on in my life.      Objective #A (Patient Action)    Status: New - Date: 5/4/22     Patient will identify 3-5 fears / thoughts that contribute to feeling anxious.    Intervention(s)  Therapist will assign homework to engage in dropping an anchor  provide educational materials on anxiety  teach the choice point.    Objective #B  Patient will initiate 1 social contact(s) per day for increasing lengths of time.    Status:  New - Date: 5/4/22     Intervention(s)  Therapist will assign homework to reach out to friends and lean into anxiety  provide educational materials on mindfulness of emotion  teach emotional recognition/identification. by using values to engage in social situations.      Patient has reviewed and agreed to the above plan.      Colette Sauceda, MIGUEL  May 4, 2022

## 2022-05-18 ENCOUNTER — VIRTUAL VISIT (OUTPATIENT)
Dept: PSYCHOLOGY | Facility: CLINIC | Age: 32
End: 2022-05-18
Payer: COMMERCIAL

## 2022-05-18 DIAGNOSIS — F43.21 ADJUSTMENT DISORDER WITH DEPRESSED MOOD: ICD-10-CM

## 2022-05-18 DIAGNOSIS — F41.1 GENERALIZED ANXIETY DISORDER: Primary | ICD-10-CM

## 2022-05-18 PROCEDURE — 90834 PSYTX W PT 45 MINUTES: CPT | Mod: 95 | Performed by: SOCIAL WORKER

## 2022-05-18 NOTE — PROGRESS NOTES
[facility]                                     Progress Note    Patient Name: Simon Corrales  Date: May 18, 2022         Service Type: Individual      Session Start Time: 11:00  Session End Time: 11:38     Session Length: 38    Session #: 3    Attendees: Client attended alone    Service Modality:  Video Visit:      Provider verified identity through the following two step process.  Patient provided:  Patient is known previously to provider    Telemedicine Visit: The patient's condition can be safely assessed and treated via synchronous audio and visual telemedicine encounter.      Reason for Telemedicine Visit: Patient has requested telehealth visit    Originating Site (Patient Location): Patient's home    Distant Site (Provider Location): Provider Remote Setting- Home Office    Consent:  The patient/guardian has verbally consented to: the potential risks and benefits of telemedicine (video visit) versus in person care; bill my insurance or make self-payment for services provided; and responsibility for payment of non-covered services.     Patient would like the video invitation sent by:  My Chart    Mode of Communication:  Video Conference via Amwell    As the provider I attest to compliance with applicable laws and regulations related to telemedicine.    DATA  Interactive Complexity: No  Crisis: No        Progress Since Last Session (Related to Symptoms / Goals / Homework):   Symptoms: Improving reports feeling calmer and has been active in his yard    Homework: Achieved / completed to satisfaction      Episode of Care Goals: Achieved / completed to satisfaction - PREPARATION (Decided to change - considering how); Intervened by negotiating a change plan and determining options / strategies for behavior change, identifying triggers, exploring social supports, and working towards setting a date to begin behavior change  Satisfactory progress - ACTION (Actively working towards change); Intervened  by reinforcing change plan / affirming steps taken     Current / Ongoing Stressors and Concerns:   Patient has lost job and is feeling unmotivated in life.  He reports a history of anxiety and notes that anxiety makes it difficult for him to have meaningful relationships.       Treatment Objective(s) Addressed in This Session:   identify 3-5 fears / thoughts that contribute to feeling anxious     Intervention:   ACT: Provided psychoeducation on anxiety.  Discussed patient's response to anxiety and what the short-term versus long-term effects of these actions are.  Provided information on the choice point.     Assessments completed prior to visit:  The following assessments were completed by patient for this visit:  PHQ2:   PHQ-2 ( 1999 [employer]) 4/27/2022 4/19/2022 3/7/2022   Q1: Little interest or pleasure in doing things 2 0 3   Q2: Feeling down, depressed or hopeless 1 0 2   PHQ-2 Score 3 0 5   Q1: Little interest or pleasure in doing things More than half the days - Nearly every day   Q2: Feeling down, depressed or hopeless Several days - More than half the days   PHQ-2 Score 3 - 5     PHQ9:   PHQ-9 SCORE 3/7/2022 4/27/2022   PHQ-9 Total Score MyChart 16 (Moderately severe depression) 10 (Moderate depression)   PHQ-9 Total Score 16 10     GAD2:   DAVE-2 4/27/2022   Feeling nervous, anxious, or on edge 1   Not being able to stop or control worrying 1   DAVE-2 Total Score 2     GAD7:   DAVE-7 SCORE 3/7/2022 4/19/2022   Total Score 17 3     PROMIS 10-Global Health (all questions and answers displayed):   PROMIS 10 4/27/2022   In general, would you say your health is: Good   In general, would you say your quality of life is: Good   In general, how would you rate your physical health? Good   In general, how would you rate your mental health, including your mood and your ability to think? Good   In general, how would you rate your satisfaction with your social activities and relationships? Fair   In general, please rate  how well you carry out your usual social activities and roles Fair   To what extent are you able to carry out your everyday physical activities such as walking, climbing stairs, carrying groceries, or moving a chair? Completely   How often have you been bothered by emotional problems such as feeling anxious, depressed or irritable? Sometimes   How would you rate your fatigue on average? Mild   How would you rate your pain on average?   0 = No Pain  to  10 = Worst Imaginable Pain 0   In general, would you say your health is: 3   In general, would you say your quality of life is: 3   In general, how would you rate your physical health? 3   In general, how would you rate your mental health, including your mood and your ability to think? 3   In general, how would you rate your satisfaction with your social activities and relationships? 2   In general, please rate how well you carry out your usual social activities and roles. (This includes activities at home, at work and in your community, and responsibilities as a parent, child, spouse, employee, friend, etc.) 2   To what extent are you able to carry out your everyday physical activities such as walking, climbing stairs, carrying groceries, or moving a chair? 5   In the past 7 days, how often have you been bothered by emotional problems such as feeling anxious, depressed, or irritable? 3   In the past 7 days, how would you rate your fatigue on average? 2   In the past 7 days, how would you rate your pain on average, where 0 means no pain, and 10 means worst imaginable pain? 0   Global Mental Health Score 11   Global Physical Health Score 17   PROMIS TOTAL - SUBSCORES 28         ASSESSMENT: Current Emotional / Mental Status (status of significant symptoms):   Risk status (Self / Other harm or suicidal ideation)   Patient denies current fears or concerns for personal safety.   Patient denies current or recent suicidal ideation or behaviors.   Patient denies current or  recent homicidal ideation or behaviors.   Patient denies current or recent self injurious behavior or ideation.   Patient denies other safety concerns.   Patient reports there has been no change in risk factors since their last session.     Patient reports there has been no change in protective factors since their last session.     Recommended that patient call 911 or go to the local ED should there be a change in any of these risk factors.     Appearance:   Appropriate    Eye Contact:   Good    Psychomotor Behavior: Normal    Attitude:   Cooperative  Interested   Orientation:   All   Speech    Rate / Production: Normal/ Responsive    Volume:  Normal    Mood:    Normal   Affect:    Appropriate    Thought Content:  Clear    Thought Form:  Coherent  Logical    Insight:    Good      Medication Review:   No changes to current psychiatric medication(s)     Medication Compliance:   Yes     Changes in Health Issues:   None reported     Chemical Use Review:   Substance Use: Chemical use reviewed, no active concerns identified      Tobacco Use: No change in amount of tobacco use since last session.  Patient declined discussion at this time    Diagnosis:  1. Generalized anxiety disorder    2. Adjustment disorder with depressed mood        Collateral Reports Completed:   Not Applicable    PLAN: (Patient Tasks / Therapist Tasks / Other)  Patient will begin job search and work outside  Will read over cognitive distortions        MIGUEL Limon                                                         ______________________________________________________________________    Individual Treatment Plan    Patient's Name: Simon Corrales  YOB: 1990    Date of Creation: May 4, 2022  Date Treatment Plan Last Reviewed/Revised: May 4, 2022    DSM5 Diagnoses: 300.02 (F41.1) Generalized Anxiety Disorder or Adjustment Disorders  309.0 (F43.21) With depressed mood  Psychosocial / Contextual Factors: Individual  Factors concerns with motivation and concentration, Economic Factors Job loss in November 2021 and Health- Seeking Factors trying therapy for first time  .  PROMIS (reviewed every 90 days): 4/28/22 28    Referral / Collaboration:  Referral to another professional/service is not indicated at this time..    Anticipated number of session for this episode of care: 10-20  Anticipation frequency of session: Every other week  Anticipated Duration of each session: 38-52 minutes  Treatment plan will be reviewed in 90 days or when goals have been changed.       MeasurableTreatment Goal(s) related to diagnosis / functional impairment(s)  Goal 1: Patient will work to improve motivation    I will know I've met my goal when having a schedule for 5/7 days, would be completing tasks around the house to fix items, working on design work, improving on different skills (animation work), continuing to build my portfolio, connecting with friends throughout the week (texting friends).      Objective #A (Patient Action)    Patient will Increase interest, engagement, and pleasure in doing things.  Status: New - Date: 5/4/22     Intervention(s)  Therapist will teach about values and valued action.    Objective #B  Patient will identify 3-4 strategies to more effectively address stressors.  Status: New - Date: 5/4/22     Intervention(s)  Therapist will assign homework to engage in compassion and activities that help manage stressors  teach compassion.      Goal 2: Patient will work on managing anxiety in social situations    I will know I've met my goal when I can tell my friends that something bothers me and I would be honest with friends about what is going on in my life.      Objective #A (Patient Action)    Status: New - Date: 5/4/22     Patient will identify 3-5 fears / thoughts that contribute to feeling anxious.    Intervention(s)  Therapist will assign homework to engage in dropping an anchor  provide educational materials on  anxiety  teach the choice point.    Objective #B  Patient will initiate 1 social contact(s) per day for increasing lengths of time.    Status: New - Date: 5/4/22     Intervention(s)  Therapist will assign homework to reach out to friends and lean into anxiety  provide educational materials on mindfulness of emotion  teach emotional recognition/identification. by using values to engage in social situations.      Patient has reviewed and agreed to the above plan.      Colette Sauceda, [facility]  May 4, 2022

## 2022-10-03 ENCOUNTER — HEALTH MAINTENANCE LETTER (OUTPATIENT)
Age: 32
End: 2022-10-03

## 2022-10-18 ENCOUNTER — FCC EXTENDED DOCUMENTATION (OUTPATIENT)
Dept: PSYCHOLOGY | Facility: CLINIC | Age: 32
End: 2022-10-18

## 2022-10-18 NOTE — PROGRESS NOTES
"                    Discharge Summary  Multiple Sessions    Client Name: Simon Corrales MRN#: [id number] YOB: 1990      Intake / Discharge Date: October 18, 2022      DSM5 Diagnoses: (Sustained by DSM5 Criteria Listed Above)  Diagnoses: 300.02 (F41.1) Generalized Anxiety Disorder  Psychosocial & Contextual Factors: Individual Factors concerns with motivation and concentration, Economic Factors Job loss in November 2021 and Health- Seeking Factors trying therapy for first time  .        Presenting Concern:  \"General anxiety, lack of motivation, self disappointment\"  \"It is the first I have ever been medicated for anxiety and I know that I have always had anxiety.  Anxiety has been more intense lately.\"      Reason for Discharge:  Client is satisfied with progress and Client did not return      Disposition at Time of Last Encounter:   Comments:   Symptoms: Improving reports feeling calmer and has been active in his yard     Risk Management:   Client denies a history of suicidal ideation, suicide attempts, self-injurious behavior, homicidal ideation, homicidal behavior and and other safety concerns  Recommended that patient call 911 or go to the local ED should there be a change in any of these risk factors.      Referred To:  Patient welcome to return should he need additional support.        Colette Sauceda, [facility]   10/18/2022  "

## 2022-10-27 DIAGNOSIS — F41.1 GENERALIZED ANXIETY DISORDER: ICD-10-CM

## 2023-04-27 DIAGNOSIS — F41.1 GENERALIZED ANXIETY DISORDER: ICD-10-CM

## 2023-05-20 ENCOUNTER — HEALTH MAINTENANCE LETTER (OUTPATIENT)
Age: 33
End: 2023-05-20

## 2023-07-30 DIAGNOSIS — F41.1 GENERALIZED ANXIETY DISORDER: ICD-10-CM

## 2023-09-22 ENCOUNTER — OFFICE VISIT (OUTPATIENT)
Dept: FAMILY MEDICINE | Facility: CLINIC | Age: 33
End: 2023-09-22

## 2023-09-22 VITALS
BODY MASS INDEX: 22.89 KG/M2 | HEIGHT: 74 IN | RESPIRATION RATE: 22 BRPM | SYSTOLIC BLOOD PRESSURE: 130 MMHG | DIASTOLIC BLOOD PRESSURE: 88 MMHG | TEMPERATURE: 97.8 F | WEIGHT: 178.4 LBS | OXYGEN SATURATION: 98 % | HEART RATE: 69 BPM

## 2023-09-22 DIAGNOSIS — F98.8 ADD (ATTENTION DEFICIT DISORDER) WITHOUT HYPERACTIVITY: ICD-10-CM

## 2023-09-22 DIAGNOSIS — Z86.59 HISTORY OF RECENT STRESSFUL LIFE EVENT: ICD-10-CM

## 2023-09-22 DIAGNOSIS — R03.0 ELEVATED BLOOD PRESSURE READING WITHOUT DIAGNOSIS OF HYPERTENSION: ICD-10-CM

## 2023-09-22 DIAGNOSIS — F41.1 GENERALIZED ANXIETY DISORDER: Primary | ICD-10-CM

## 2023-09-22 PROCEDURE — 99214 OFFICE O/P EST MOD 30 MIN: CPT | Performed by: NURSE PRACTITIONER

## 2023-09-22 PROCEDURE — 96127 BRIEF EMOTIONAL/BEHAV ASSMT: CPT | Mod: 59 | Performed by: NURSE PRACTITIONER

## 2023-09-22 ASSESSMENT — ANXIETY QUESTIONNAIRES
GAD7 TOTAL SCORE: 11
1. FEELING NERVOUS, ANXIOUS, OR ON EDGE: MORE THAN HALF THE DAYS
4. TROUBLE RELAXING: MORE THAN HALF THE DAYS
7. FEELING AFRAID AS IF SOMETHING AWFUL MIGHT HAPPEN: SEVERAL DAYS
6. BECOMING EASILY ANNOYED OR IRRITABLE: SEVERAL DAYS
GAD7 TOTAL SCORE: 11
2. NOT BEING ABLE TO STOP OR CONTROL WORRYING: SEVERAL DAYS
3. WORRYING TOO MUCH ABOUT DIFFERENT THINGS: MORE THAN HALF THE DAYS
5. BEING SO RESTLESS THAT IT IS HARD TO SIT STILL: MORE THAN HALF THE DAYS
IF YOU CHECKED OFF ANY PROBLEMS ON THIS QUESTIONNAIRE, HOW DIFFICULT HAVE THESE PROBLEMS MADE IT FOR YOU TO DO YOUR WORK, TAKE CARE OF THINGS AT HOME, OR GET ALONG WITH OTHER PEOPLE: SOMEWHAT DIFFICULT

## 2023-09-22 ASSESSMENT — PAIN SCALES - GENERAL: PAINLEVEL: NO PAIN (0)

## 2023-09-22 ASSESSMENT — PATIENT HEALTH QUESTIONNAIRE - PHQ9
SUM OF ALL RESPONSES TO PHQ QUESTIONS 1-9: 12
SUM OF ALL RESPONSES TO PHQ QUESTIONS 1-9: 12
10. IF YOU CHECKED OFF ANY PROBLEMS, HOW DIFFICULT HAVE THESE PROBLEMS MADE IT FOR YOU TO DO YOUR WORK, TAKE CARE OF THINGS AT HOME, OR GET ALONG WITH OTHER PEOPLE: VERY DIFFICULT

## 2023-09-22 NOTE — COMMUNITY RESOURCES LIST (ENGLISH)
09/22/2023   [facility]  N/A  For questions about this resource list or additional care needs, please contact your primary care clinic or care manager.  Phone: [phone]   Email: N/A   Address: [address] [place] [address]   Hours: N/A        Food and Nutrition       Food pantry  1  [facility] - Food Shelf Distance: 0.6 miles      Pickup   [address] [place] [address]  Language: English, Burundian  Hours: Mon 8:00 AM - 11:30 AM , Mon 1:00 PM - 3:45 PM , Wed 8:00 AM - 11:30 AM , Wed 1:00 PM - 3:45 PM , Fri 1:00 PM - 3:45 PM  Fees: Free   Phone: (159) 145-9435 Email: Kevin@[facility] Website: https://[facility]/home/#whatwedo     2  [facility] Distance: 1.27 miles      In-Person   [address] [id number]  Language: English  Hours: Wed 5:30 PM - 6:30 PM  Fees: Free   Phone: (907) 264-7588 Email: info@[facility].org Website: https://www.[facility].org/     SNAP application assistance  3  [facility] Extension - SNAP Ed - SNAP Ed Distance: 3.3 miles      In-Person   [address] [place] [address]  Language: English, Hmong, Oromo, Costa Rican, Burundian  Hours: Mon - Fri 9:00 AM - 5:00 PM Appt. Only  Fees: Free   Phone: (309) 371-4241 Email: mnext@[facility] Website: https://extension.[facility]/nutrition-education/snap-ed-educational-offerings     4  [facility] Services and [facility] Distance: 3.33 miles      In-Person, Phone/Virtual   [address] [place] [address]  Language: English  Hours: Mon - Fri 8:00 AM - 4:30 PM  Fees: Free   Phone: (774) 338-1043 Email: servicecenterinfo@[facility] Website: https://www.[place]./your-government/facilities/service-center-info     Soup kitchen or free meals  5  [facility] -  Community Meal Distance: 1.71 miles      Pickup   [address] [place] [address]  Language: English  Hours: Mon 5:00 PM - 5:45 PM  Fees: Free   Phone: (452) 786-4638 Email: office@[facility] Center[facility] Website: http://www.[facility] CenterPortr[facility].[employer]     6  [facility] Dinner Distance: 3.21 miles      Pickup   [address] [place] [address]  Language: English  Hours: Tue 5:00 PM - 6:30 PM  Fees: Free   Phone: (491) 510-7229 Email: admin@[employer] Website: http://www.[employer]/          Important Numbers & Websites       Emergency Services   911  [facility] Services   311  Poison Control   (976) 839-2873  Suicide Prevention Lifeline   (220) 944-7595 (TALK)  Child Abuse Hotline   (308) 742-4748 (4-A-Child)  Sexual Assault Hotline   (338) 549-1213 (HOPE)  National Runaway Safeline   (433) 192-7207 (RUNAWAY)  All-Options Talkline   (564) 986-7022  Substance Abuse Referral   (154) 113-3392 (HELP)

## 2023-09-22 NOTE — COMMUNITY RESOURCES LIST (ENGLISH)
09/22/2023   [facility] Outpatient Clinics  N/A  For additional resource needs, please contact your health insurance member services or your primary care team.  Phone: [phone]   Email: N/A   Address: [address] [place] [address]   Hours: N/A        Food and Nutrition       Food pantry  1  [facility] - Food Shelf Distance: 0.6 miles      Pickup   [address] [place] [address]  Language: English, Bangladeshi  Hours: Mon 8:00 AM - 11:30 AM , Mon 1:00 PM - 3:45 PM , Wed 8:00 AM - 11:30 AM , Wed 1:00 PM - 3:45 PM , Fri 1:00 PM - 3:45 PM  Fees: Free   Phone: (844) 116-9757 Email: Kevin@[facility] Website: https://[facility]/home/#whatwedo     2  [facility] and Critical access hospital Distance: 1.27 miles      In-Person   [address] [place] [address]  Language: English  Hours: Wed 5:30 PM - 6:30 PM  Fees: Free   Phone: (294) 771-8847 Email: info@[employer].org Website: https://www.[employer].org/     SNAP application assistance  3  [employer] [place] Distance: 7.54 miles      Phone/[address] [address]  Language: English, Hmong, Tristanian, Mauritian, Bangladeshi  Hours: Mon - Fri 8:30 AM - 4:30 PM  Fees: Free   Phone: (386) 885-2840 Email: helpline@[employer].org Website: https://www.[employer]ers[employer].org/programs/[place]-food-helpline/     4  [facility] Extension - SNAP Ed - SNAP Ed Distance: 3.3 miles      In-Person   [address] [place] [address]  Language: English, Hmong, Oromo, Mauritian, Bangladeshi  Hours: Mon - Fri 9:00 AM - 5:00 PM Appt. Only  Fees: Free   Phone: (512) 152-9224 Email: mnext@[employer] Website: https://extension.[facility]/nutrition-education/snap-ed-educational-offerings     Soup kitchen or free meals  5  [facility] - Community Meal Distance: 1.71 miles      Pickup   [address]  [place] [address]  Language: English  Hours: Mon 5:00 PM - 5:45 PM  Fees: Free   Phone: (923) 728-6605 Email: office@[employer] Website: http://www.[employer]       [facility] D[facility] Distance: 3.21 miles      Pickup   [address] [place] [address]  Language: English  Hours: Tue 5:00 PM - 6:30 PM  Fees: Free   Phone: (394) 355-8653 Email: admin@[employer] Website: http://www.[employer]/          Important Numbers & Websites       [address].org  Poison Control   (712) 261-5726 [place]  Suicide and Crisis Lifeline   [address] 98[facility].org  Childhelp [place] Child Abuse Hotline   920.933.2468 Childhelphotline.org  National Sexual Assault Hotline   (648) 837-5689 (HOPE) [facility]  National Runaway Safeline   (402) 521-4432 (RUNAWAY) [facility] Hospital[facility].org  Pregnancy & Postpartum Support [place]   Call/text 987-967-1582 Ppsupportmn.org  Substance Abuse National Helpline ([facility]   357-263-HELP (2433) Findtreatment.gov  Emergency Services   911

## 2023-09-22 NOTE — PROGRESS NOTES
Assessment & Plan     Generalized anxiety disorder  Chronic social anxiety. Happy with current dose. Needing refills. He was hoping it would help more with focus/attention, but it hasn't.     - sertraline (ZOLOFT) 50 MG tablet; Take 1 tablet (50 mg) by mouth daily    ADD (attention deficit disorder) without hyperactivity  Chronic. Used to be on Adderall as a child, but not on anything as an adult. He will reach out to PCP if he wants to go back on medication. Advised he follow-up with PCP as he will need to sign a new controlled medication contract with prescribing provider.     History of recent stressful life event  He is currently in the job interviewing process for a new job. Likely causing elevated anxiety and BP.     Elevated blood pressure reading without diagnosis of hypertension  Likely 2/2 job interview process right now. States he is under a lot of stress. Discussed monitoring it and follow-up if consistently >140/90 after stressful time resolves as it should only be temporary.     Prescription drug management         Depression Screening Follow Up        9/22/2023     3:36 PM   PHQ   PHQ-9 Total Score 12   Q9: Thoughts of better off dead/self-harm past 2 weeks Not at all         Follow Up Actions Taken  Crisis resource information provided in After Visit Summary     Patient Instructions   Zoloft refilled.     Make appt with PCP for annual physical and ADHD follow-up if you would like to further discuss restarting Adderall.     Anali Sadler DNP  [facility]    Miller Montes is a 33 year old, presenting for the following health issues:  No chief complaint on file.        9/22/2023     3:42 PM   Additional Questions   Roomed by Jess   Accompanied by none         9/22/2023     3:42 PM   Patient Reported Additional Medications   Patient reports taking the following new medications none       History of Present Illness       Mental Health Follow-up:  Patient presents to  follow-up on Depression & Anxiety.Patient's depression since last visit has been:  Better  The patient is not having other symptoms associated with depression.  Patient's anxiety since last visit has been:  Medium  The patient is not having other symptoms associated with anxiety.  Any significant life events: job concerns and financial concerns  Patient is feeling anxious or having panic attacks.  Patient has no concerns about alcohol or drug use.    He eats 2-3 servings of fruits and vegetables daily.He consumes 1 sweetened beverage(s) daily.He exercises with enough effort to increase his heart rate 30 to 60 minutes per day.  He exercises with enough effort to increase his heart rate 5 days per week.   He is taking medications regularly.         3/7/2022     4:14 PM 4/27/2022     7:54 PM 9/22/2023     3:36 PM   PHQ   PHQ-9 Total Score 16 10 12   Q9: Thoughts of better off dead/self-harm past 2 weeks Not at all Not at all Not at all         3/7/2022     4:14 PM 4/19/2022     1:04 PM 9/22/2023     3:37 PM   DAVE-7 SCORE   Total Score   11 (moderate anxiety)   Total Score 17 3 11           Medication Followup of sertraline   Taking Medication as prescribed: yes  Side Effects:  None  Medication Helping Symptoms:  yes    Additional provider notes: Patient presents in clinic for medication refill. States the zoloft helps a lot with the social anxiety. Hx of situational depression, resolved. States he has a hard time focusing and was hoping it would help with that, but it hasn't.     Elevated BP in clinic today: He is in the middle of a job interview process so he is a bit stressed out today/recently.     ADHD: diagnosed in high school. He was on Adderall and stopped taking after he graduated.     No Known Allergies    Current Outpatient Medications   Medication    sertraline (ZOLOFT) 50 MG tablet    triamcinolone (KENALOG) 0.5 % external ointment     No current facility-administered medications for this visit.       No  "past medical history on file.         Review of Systems   All other systems reviewed and are negative.           Objective    BP (!) 148/88 (BP Location: Right arm, Patient Position: Sitting, Cuff Size: Adult Regular)   Pulse 69   Temp 97.8  F (36.6  C) (Tympanic)   Resp 22   Ht 1.873 m (6' 1.75\")   Wt 80.9 kg (178 lb 6.4 oz)   SpO2 98%   BMI 23.06 kg/m    Body mass index is 23.06 kg/m .  Physical Exam  Vitals reviewed.   Constitutional:       General: He is not in acute distress.     Appearance: Normal appearance. He is not ill-appearing or toxic-appearing.   Cardiovascular:      Rate and Rhythm: Normal rate and regular rhythm.      Pulses: Normal pulses.      Heart sounds: Normal heart sounds.   Musculoskeletal:         General: Normal range of motion.   Skin:     General: Skin is warm and dry.   Neurological:      Mental Status: He is alert and oriented to person, place, and time.   Psychiatric:         Behavior: Behavior normal.                              "

## 2023-09-22 NOTE — PATIENT INSTRUCTIONS
Zoloft refilled.     Make appt with PCP for annual physical and ADHD follow-up if you would like to further discuss restarting Adderall.

## 2023-09-24 PROBLEM — R03.0 ELEVATED BLOOD PRESSURE READING WITHOUT DIAGNOSIS OF HYPERTENSION: Status: ACTIVE | Noted: 2023-09-24

## 2024-07-28 ENCOUNTER — HEALTH MAINTENANCE LETTER (OUTPATIENT)
Age: 34
End: 2024-07-28

## 2024-10-20 ENCOUNTER — TELEPHONE (OUTPATIENT)
Dept: FAMILY MEDICINE | Facility: CLINIC | Age: 34
End: 2024-10-20

## 2024-10-20 DIAGNOSIS — F41.1 GENERALIZED ANXIETY DISORDER: ICD-10-CM

## 2024-10-21 NOTE — TELEPHONE ENCOUNTER
90 day refill sent. Please help schedule annual with fasting labs.     Thanks,  Anali Sadler, LEONA, NP-C

## 2024-10-22 NOTE — TELEPHONE ENCOUNTER
Called patient and left a voicemail message to call the clinic and schedule an Annual physical/ fasting labs appointment.      Dayna Curran

## 2024-10-25 ENCOUNTER — TELEPHONE (OUTPATIENT)
Dept: FAMILY MEDICINE | Facility: CLINIC | Age: 34
End: 2024-10-25

## 2024-10-25 NOTE — TELEPHONE ENCOUNTER
Patient Quality Outreach    Patient is due for the following:   Depression  -  PHQ-9 needed and Depression follow-up visit  Physical Preventive Adult Physical    Next Steps:   Schedule a Adult Preventative    Type of outreach:    Sent letter.      Questions for provider review:    None           Brenda Garcia CMA  Chart routed to Care Team.

## 2024-10-25 NOTE — LETTER
October 25, 2024      Simon Corrales  [address] [address]                Dear Simon Corrales,    Your clinic record indicates that you are overdue for a Yearly Preventative Exam and Mental Health follow up. Please call the  at [phone] to schedule an appointment.     If you have questions about this letter please contact your provider.    Sincerely,    Your [facility] Team

## 2025-02-21 ENCOUNTER — TELEPHONE (OUTPATIENT)
Dept: FAMILY MEDICINE | Facility: CLINIC | Age: 35
End: 2025-02-21

## 2025-02-21 DIAGNOSIS — F41.1 GENERALIZED ANXIETY DISORDER: ICD-10-CM

## 2025-02-21 NOTE — TELEPHONE ENCOUNTER
Called patient and left a voicemail message to call the clinic and schedule an Annual physical/med check appointment.      Dayna Curran

## 2025-02-28 NOTE — TELEPHONE ENCOUNTER
Tc reached out to relay message no answer VM message left to call clinic      Maite Brock    [facility] \

## 2025-03-06 NOTE — TELEPHONE ENCOUNTER
Routing to RN. Please un pend order and close encounter.    3rd attempt.      Called patient and left a voicemail message to call the clinic and schedule an Annual physical/med check appointment.      Dayna Curran

## 2025-08-10 ENCOUNTER — HEALTH MAINTENANCE LETTER (OUTPATIENT)
Age: 35
End: 2025-08-10